# Patient Record
Sex: MALE | Race: WHITE | NOT HISPANIC OR LATINO | Employment: OTHER | URBAN - METROPOLITAN AREA
[De-identification: names, ages, dates, MRNs, and addresses within clinical notes are randomized per-mention and may not be internally consistent; named-entity substitution may affect disease eponyms.]

---

## 2017-06-15 ENCOUNTER — RECORDS - HEALTHEAST (OUTPATIENT)
Dept: LAB | Facility: CLINIC | Age: 52
End: 2017-06-15

## 2017-06-15 ENCOUNTER — RECORDS - HEALTHEAST (OUTPATIENT)
Dept: ADMINISTRATIVE | Facility: OTHER | Age: 52
End: 2017-06-15

## 2017-06-15 LAB
CHOLEST SERPL-MCNC: 171 MG/DL
FASTING STATUS PATIENT QL REPORTED: NORMAL
HDLC SERPL-MCNC: 67 MG/DL
LDLC SERPL CALC-MCNC: 84 MG/DL
PSA SERPL-MCNC: 0.7 NG/ML (ref 0–3.5)
TRIGL SERPL-MCNC: 99 MG/DL

## 2017-10-20 ENCOUNTER — RECORDS - HEALTHEAST (OUTPATIENT)
Dept: ADMINISTRATIVE | Facility: OTHER | Age: 52
End: 2017-10-20

## 2017-10-20 ENCOUNTER — AMBULATORY - HEALTHEAST (OUTPATIENT)
Dept: SURGERY | Facility: CLINIC | Age: 52
End: 2017-10-20

## 2017-10-20 DIAGNOSIS — K40.20 BILATERAL INGUINAL HERNIA: ICD-10-CM

## 2017-11-03 ENCOUNTER — AMBULATORY - HEALTHEAST (OUTPATIENT)
Dept: SURGERY | Facility: CLINIC | Age: 52
End: 2017-11-03

## 2017-11-06 ENCOUNTER — OFFICE VISIT - HEALTHEAST (OUTPATIENT)
Dept: SURGERY | Facility: CLINIC | Age: 52
End: 2017-11-06

## 2017-11-06 DIAGNOSIS — K40.20 BILATERAL INGUINAL HERNIA: ICD-10-CM

## 2017-11-06 ASSESSMENT — MIFFLIN-ST. JEOR: SCORE: 1777

## 2018-01-03 ENCOUNTER — ANESTHESIA - HEALTHEAST (OUTPATIENT)
Dept: SURGERY | Facility: AMBULATORY SURGERY CENTER | Age: 53
End: 2018-01-03

## 2018-01-03 ASSESSMENT — MIFFLIN-ST. JEOR: SCORE: 1795.14

## 2018-01-04 ENCOUNTER — SURGERY - HEALTHEAST (OUTPATIENT)
Dept: SURGERY | Facility: AMBULATORY SURGERY CENTER | Age: 53
End: 2018-01-04

## 2018-01-04 ENCOUNTER — RECORDS - HEALTHEAST (OUTPATIENT)
Dept: ADMINISTRATIVE | Facility: OTHER | Age: 53
End: 2018-01-04

## 2018-06-05 ENCOUNTER — RECORDS - HEALTHEAST (OUTPATIENT)
Dept: LAB | Facility: CLINIC | Age: 53
End: 2018-06-05

## 2018-06-05 LAB
ANION GAP SERPL CALCULATED.3IONS-SCNC: 11 MMOL/L (ref 5–18)
BUN SERPL-MCNC: 29 MG/DL (ref 8–22)
CALCIUM SERPL-MCNC: 9.4 MG/DL (ref 8.5–10.5)
CHLORIDE BLD-SCNC: 106 MMOL/L (ref 98–107)
CHOLEST SERPL-MCNC: 164 MG/DL
CO2 SERPL-SCNC: 24 MMOL/L (ref 22–31)
CREAT SERPL-MCNC: 0.76 MG/DL (ref 0.7–1.3)
FASTING STATUS PATIENT QL REPORTED: NO
GFR SERPL CREATININE-BSD FRML MDRD: >60 ML/MIN/1.73M2
GLUCOSE BLD-MCNC: 80 MG/DL (ref 70–125)
HDLC SERPL-MCNC: 65 MG/DL
LDLC SERPL CALC-MCNC: 74 MG/DL
POTASSIUM BLD-SCNC: 4.1 MMOL/L (ref 3.5–5)
SODIUM SERPL-SCNC: 141 MMOL/L (ref 136–145)
TRIGL SERPL-MCNC: 127 MG/DL

## 2018-06-06 LAB — 25(OH)D3 SERPL-MCNC: 28.2 NG/ML (ref 30–80)

## 2019-01-14 ENCOUNTER — RECORDS - HEALTHEAST (OUTPATIENT)
Dept: LAB | Facility: CLINIC | Age: 54
End: 2019-01-14

## 2019-01-14 LAB
ANION GAP SERPL CALCULATED.3IONS-SCNC: 11 MMOL/L (ref 5–18)
BUN SERPL-MCNC: 27 MG/DL (ref 8–22)
CALCIUM SERPL-MCNC: 9.7 MG/DL (ref 8.5–10.5)
CHLORIDE BLD-SCNC: 103 MMOL/L (ref 98–107)
CO2 SERPL-SCNC: 27 MMOL/L (ref 22–31)
CREAT SERPL-MCNC: 0.81 MG/DL (ref 0.7–1.3)
GFR SERPL CREATININE-BSD FRML MDRD: >60 ML/MIN/1.73M2
GLUCOSE BLD-MCNC: 91 MG/DL (ref 70–125)
POTASSIUM BLD-SCNC: 4.2 MMOL/L (ref 3.5–5)
SODIUM SERPL-SCNC: 141 MMOL/L (ref 136–145)

## 2019-01-15 LAB — 25(OH)D3 SERPL-MCNC: 28.4 NG/ML (ref 30–80)

## 2019-06-12 ENCOUNTER — RECORDS - HEALTHEAST (OUTPATIENT)
Dept: LAB | Facility: CLINIC | Age: 54
End: 2019-06-12

## 2019-06-12 LAB
ANION GAP SERPL CALCULATED.3IONS-SCNC: 8 MMOL/L (ref 5–18)
BUN SERPL-MCNC: 23 MG/DL (ref 8–22)
CALCIUM SERPL-MCNC: 9.9 MG/DL (ref 8.5–10.5)
CHLORIDE BLD-SCNC: 105 MMOL/L (ref 98–107)
CHOLEST SERPL-MCNC: 178 MG/DL
CO2 SERPL-SCNC: 29 MMOL/L (ref 22–31)
CREAT SERPL-MCNC: 0.74 MG/DL (ref 0.7–1.3)
FASTING STATUS PATIENT QL REPORTED: YES
GFR SERPL CREATININE-BSD FRML MDRD: >60 ML/MIN/1.73M2
GLUCOSE BLD-MCNC: 88 MG/DL (ref 70–125)
HDLC SERPL-MCNC: 70 MG/DL
LDLC SERPL CALC-MCNC: 93 MG/DL
POTASSIUM BLD-SCNC: 4.3 MMOL/L (ref 3.5–5)
SODIUM SERPL-SCNC: 142 MMOL/L (ref 136–145)
TRIGL SERPL-MCNC: 77 MG/DL

## 2019-06-13 LAB — 25(OH)D3 SERPL-MCNC: 30.2 NG/ML (ref 30–80)

## 2019-12-11 ENCOUNTER — RECORDS - HEALTHEAST (OUTPATIENT)
Dept: LAB | Facility: CLINIC | Age: 54
End: 2019-12-11

## 2019-12-11 LAB
ANION GAP SERPL CALCULATED.3IONS-SCNC: 12 MMOL/L (ref 5–18)
BUN SERPL-MCNC: 23 MG/DL (ref 8–22)
CALCIUM SERPL-MCNC: 9.6 MG/DL (ref 8.5–10.5)
CHLORIDE BLD-SCNC: 103 MMOL/L (ref 98–107)
CO2 SERPL-SCNC: 27 MMOL/L (ref 22–31)
CREAT SERPL-MCNC: 0.81 MG/DL (ref 0.7–1.3)
GFR SERPL CREATININE-BSD FRML MDRD: >60 ML/MIN/1.73M2
GLUCOSE BLD-MCNC: 72 MG/DL (ref 70–125)
POTASSIUM BLD-SCNC: 4.1 MMOL/L (ref 3.5–5)
SODIUM SERPL-SCNC: 142 MMOL/L (ref 136–145)

## 2019-12-12 LAB — 25(OH)D3 SERPL-MCNC: 34 NG/ML (ref 30–80)

## 2020-06-09 ENCOUNTER — RECORDS - HEALTHEAST (OUTPATIENT)
Dept: LAB | Facility: CLINIC | Age: 55
End: 2020-06-09

## 2020-06-09 LAB
ANION GAP SERPL CALCULATED.3IONS-SCNC: 10 MMOL/L (ref 5–18)
BUN SERPL-MCNC: 27 MG/DL (ref 8–22)
CALCIUM SERPL-MCNC: 10.3 MG/DL (ref 8.5–10.5)
CHLORIDE BLD-SCNC: 103 MMOL/L (ref 98–107)
CHOLEST SERPL-MCNC: 199 MG/DL
CO2 SERPL-SCNC: 28 MMOL/L (ref 22–31)
CREAT SERPL-MCNC: 0.97 MG/DL (ref 0.7–1.3)
FASTING STATUS PATIENT QL REPORTED: NORMAL
GFR SERPL CREATININE-BSD FRML MDRD: >60 ML/MIN/1.73M2
GLUCOSE BLD-MCNC: 95 MG/DL (ref 70–125)
HDLC SERPL-MCNC: 76 MG/DL
LDLC SERPL CALC-MCNC: 93 MG/DL
POTASSIUM BLD-SCNC: 4.3 MMOL/L (ref 3.5–5)
SODIUM SERPL-SCNC: 141 MMOL/L (ref 136–145)
TRIGL SERPL-MCNC: 149 MG/DL

## 2021-01-18 ENCOUNTER — RECORDS - HEALTHEAST (OUTPATIENT)
Dept: LAB | Facility: CLINIC | Age: 56
End: 2021-01-18

## 2021-01-18 LAB
ANION GAP SERPL CALCULATED.3IONS-SCNC: 12 MMOL/L (ref 5–18)
BUN SERPL-MCNC: 25 MG/DL (ref 8–22)
CALCIUM SERPL-MCNC: 9.1 MG/DL (ref 8.5–10.5)
CHLORIDE BLD-SCNC: 101 MMOL/L (ref 98–107)
CHOLEST SERPL-MCNC: 172 MG/DL
CO2 SERPL-SCNC: 27 MMOL/L (ref 22–31)
CREAT SERPL-MCNC: 0.84 MG/DL (ref 0.7–1.3)
FASTING STATUS PATIENT QL REPORTED: NORMAL
GFR SERPL CREATININE-BSD FRML MDRD: >60 ML/MIN/1.73M2
GLUCOSE BLD-MCNC: 86 MG/DL (ref 70–125)
HDLC SERPL-MCNC: 75 MG/DL
LDLC SERPL CALC-MCNC: 77 MG/DL
POTASSIUM BLD-SCNC: 4.4 MMOL/L (ref 3.5–5)
SODIUM SERPL-SCNC: 140 MMOL/L (ref 136–145)
TRIGL SERPL-MCNC: 102 MG/DL

## 2021-01-19 LAB — 25(OH)D3 SERPL-MCNC: 33.5 NG/ML (ref 30–80)

## 2021-04-08 ENCOUNTER — IMMUNIZATION (OUTPATIENT)
Dept: FAMILY MEDICINE | Facility: CLINIC | Age: 56
End: 2021-04-08
Payer: COMMERCIAL

## 2021-04-08 PROCEDURE — 0011A PR COVID VAC MODERNA 100 MCG/0.5 ML IM: CPT

## 2021-04-08 PROCEDURE — 91301 PR COVID VAC MODERNA 100 MCG/0.5 ML IM: CPT

## 2021-05-06 ENCOUNTER — IMMUNIZATION (OUTPATIENT)
Dept: FAMILY MEDICINE | Facility: CLINIC | Age: 56
End: 2021-05-06
Attending: FAMILY MEDICINE
Payer: COMMERCIAL

## 2021-05-06 PROCEDURE — 91301 PR COVID VAC MODERNA 100 MCG/0.5 ML IM: CPT

## 2021-05-06 PROCEDURE — 0012A PR COVID VAC MODERNA 100 MCG/0.5 ML IM: CPT

## 2021-05-15 ENCOUNTER — HEALTH MAINTENANCE LETTER (OUTPATIENT)
Age: 56
End: 2021-05-15

## 2021-05-31 VITALS — BODY MASS INDEX: 23.75 KG/M2 | WEIGHT: 191 LBS | HEIGHT: 75 IN

## 2021-05-31 VITALS — BODY MASS INDEX: 24.25 KG/M2 | WEIGHT: 195 LBS | HEIGHT: 75 IN

## 2021-06-14 NOTE — PROGRESS NOTES
"HPI:  This is a 52 y.o. male here today with concerns of pain and bulging in his  bilateral groins area. he has noted this for the past few month(s). The symptoms have progressed and increased over this time.     Allergies:Budesonide and Levofloxacin    Past Medical History:   Diagnosis Date     Asthma      Glaucoma      Hyperlipemia      Hypertension      Nasal polyps      Pterygium      Seasonal allergic rhinitis        Past Surgical History:   Procedure Laterality Date     KNEE ARTHROSCOPY Bilateral      TONSILLECTOMY       VASECTOMY  01/02/2003       CURRENT MEDS:      Family History   Problem Relation Age of Onset     Hypertension Mother      Hypertension Father      Diabetes Father      No Medical Problems Sister      No Medical Problems Daughter      No Medical Problems Son      No Medical Problems Sister      No Medical Problems Sister         reports that he has never smoked. He has never used smokeless tobacco. He reports that he drinks alcohol. He reports that he does not use illicit drugs.    Review of Systems - Negative except pain, bulges in groin causing increasing symptoms and issues. Otherwise twelve system of review is negative.      Vitals:    11/06/17 0900   BP: (!) 156/105   Patient Site: Left Arm   Patient Position: Sitting   Cuff Size: Adult Regular   Pulse: 60   SpO2: 99%   Weight: 191 lb (86.6 kg)   Height: 6' 3\" (1.905 m)       Body mass index is 23.87 kg/(m^2).    EXAM:  General: NAD   HEENT: normocephalic, PERRLA and EOMS intact  Mounth: Mucus membranes moist  Neck: Supple  Chest: Clear to auscultation bilaterally  CV: RRR  ABD: Soft nontender and nondistended, bilateral inguinal hernia, reducible  EXT: Warm, pulses intact,   Neuro: Alert and oriented x3  Back: no CVA tenderness      Assessment/Plan: Pt with a bilateral inguinal hernia. I discussed this at length with him.  I went over conservative management as well as surgical treatment of this.. I would plan on doing this via " anlaparoscopic  approach with possible use of mesh. I went over the small risks of surgery including but not limited to bleeding and infection, anesthesia, recurrence rates and nerve injury. I discussed the expected recovery time as well. Will get this scheduled. he will contact us to have this scheduled.      Max De La Cruz MD  [facility] Department of Surgery

## 2021-06-15 NOTE — ANESTHESIA PREPROCEDURE EVALUATION
Anesthesia Evaluation      Patient summary reviewed     Airway   Mallampati: II  Neck ROM: full   Pulmonary     breath sounds clear to auscultation                         Cardiovascular   Rhythm: regular  Rate: normal,         Neuro/Psych - negative ROS     Endo/Other - negative ROS      GI/Hepatic/Renal - negative ROS           Dental - normal exam                        Anesthesia Plan  Planned anesthetic: general endotracheal    ASA 2   Induction: intravenous   Anesthetic plan and risks discussed with: patient    Post-op plan: routine recovery

## 2021-06-15 NOTE — ANESTHESIA CARE TRANSFER NOTE
Last vitals:   Vitals:    01/04/18 1130   BP: (!) 175/103   Pulse: 68   Resp: 16   Temp: 36.7  C (98  F)   SpO2: 100%     Patient's level of consciousness is drowsy  Spontaneous respirations: yes  Maintains airway independently: yes  Dentition unchanged: yes  Oropharynx: oropharynx clear of all foreign objects    QCDR Measures:  ASA# 20 - Surgical Safety Checklist: WHO surgical safety checklist completed prior to induction  PQRS# 430 - Adult PONV Prevention: 4558F - Pt received => 2 anti-emetic agents (different classes) preop & intraop  ASA# 8 - Peds PONV Prevention: NA - Not pediatric patient, not GA or 2 or more risk factors NOT present  PQRS# 424 - Johanna-op Temp Management: 4559F - At least one body temp DOCUMENTED => 35.5C or 95.9F within required timeframe  PQRS# 426 - PACU Transfer Protocol: - Transfer of care checklist used  ASA# 14 - Acute Post-op Pain: ASA14B - Patient did NOT experience pain >= 7 out of 10

## 2021-08-04 ENCOUNTER — LAB REQUISITION (OUTPATIENT)
Dept: LAB | Facility: CLINIC | Age: 56
End: 2021-08-04

## 2021-08-04 DIAGNOSIS — E55.9 VITAMIN D DEFICIENCY, UNSPECIFIED: ICD-10-CM

## 2021-08-04 DIAGNOSIS — I10 ESSENTIAL (PRIMARY) HYPERTENSION: ICD-10-CM

## 2021-08-04 DIAGNOSIS — Z12.5 ENCOUNTER FOR SCREENING FOR MALIGNANT NEOPLASM OF PROSTATE: ICD-10-CM

## 2021-08-04 DIAGNOSIS — E78.2 MIXED HYPERLIPIDEMIA: ICD-10-CM

## 2021-08-04 LAB
ALBUMIN SERPL-MCNC: 4 G/DL (ref 3.5–5)
ALP SERPL-CCNC: 77 U/L (ref 45–120)
ALT SERPL W P-5'-P-CCNC: 20 U/L (ref 0–45)
ANION GAP SERPL CALCULATED.3IONS-SCNC: 10 MMOL/L (ref 5–18)
AST SERPL W P-5'-P-CCNC: 19 U/L (ref 0–40)
BILIRUB SERPL-MCNC: 0.9 MG/DL (ref 0–1)
BUN SERPL-MCNC: 24 MG/DL (ref 8–22)
CALCIUM SERPL-MCNC: 9.9 MG/DL (ref 8.5–10.5)
CHLORIDE BLD-SCNC: 103 MMOL/L (ref 98–107)
CHOLEST SERPL-MCNC: 171 MG/DL
CO2 SERPL-SCNC: 29 MMOL/L (ref 22–31)
CREAT SERPL-MCNC: 0.8 MG/DL (ref 0.7–1.3)
FASTING STATUS PATIENT QL REPORTED: NORMAL
GFR SERPL CREATININE-BSD FRML MDRD: >90 ML/MIN/1.73M2
GLUCOSE BLD-MCNC: 95 MG/DL (ref 70–125)
HDLC SERPL-MCNC: 72 MG/DL
LDLC SERPL CALC-MCNC: 86 MG/DL
MAGNESIUM SERPL-MCNC: 2.2 MG/DL (ref 1.8–2.6)
POTASSIUM BLD-SCNC: 4.5 MMOL/L (ref 3.5–5)
PROT SERPL-MCNC: 6.7 G/DL (ref 6–8)
PSA SERPL-MCNC: 0.56 UG/L (ref 0–3.5)
SODIUM SERPL-SCNC: 142 MMOL/L (ref 136–145)
TRIGL SERPL-MCNC: 67 MG/DL

## 2021-08-04 PROCEDURE — 83735 ASSAY OF MAGNESIUM: CPT | Performed by: NURSE PRACTITIONER

## 2021-08-04 PROCEDURE — G0103 PSA SCREENING: HCPCS | Performed by: NURSE PRACTITIONER

## 2021-08-04 PROCEDURE — 82465 ASSAY BLD/SERUM CHOLESTEROL: CPT | Performed by: NURSE PRACTITIONER

## 2021-08-04 PROCEDURE — 82306 VITAMIN D 25 HYDROXY: CPT | Performed by: NURSE PRACTITIONER

## 2021-08-04 PROCEDURE — 82040 ASSAY OF SERUM ALBUMIN: CPT | Performed by: NURSE PRACTITIONER

## 2021-08-05 LAB — DEPRECATED CALCIDIOL+CALCIFEROL SERPL-MC: 45 UG/L (ref 30–80)

## 2021-09-04 ENCOUNTER — HEALTH MAINTENANCE LETTER (OUTPATIENT)
Age: 56
End: 2021-09-04

## 2022-03-07 ENCOUNTER — LAB REQUISITION (OUTPATIENT)
Dept: LAB | Facility: CLINIC | Age: 57
End: 2022-03-07

## 2022-03-07 DIAGNOSIS — E78.2 MIXED HYPERLIPIDEMIA: ICD-10-CM

## 2022-03-07 DIAGNOSIS — E55.9 VITAMIN D DEFICIENCY, UNSPECIFIED: ICD-10-CM

## 2022-03-07 DIAGNOSIS — I10 ESSENTIAL (PRIMARY) HYPERTENSION: ICD-10-CM

## 2022-03-07 LAB
ALBUMIN SERPL-MCNC: 4.3 G/DL (ref 3.5–5)
ALP SERPL-CCNC: 88 U/L (ref 45–120)
ALT SERPL W P-5'-P-CCNC: 27 U/L (ref 0–45)
ANION GAP SERPL CALCULATED.3IONS-SCNC: 15 MMOL/L (ref 5–18)
AST SERPL W P-5'-P-CCNC: 23 U/L (ref 0–40)
BILIRUB SERPL-MCNC: 1.2 MG/DL (ref 0–1)
BUN SERPL-MCNC: 28 MG/DL (ref 8–22)
CALCIUM SERPL-MCNC: 10.1 MG/DL (ref 8.5–10.5)
CHLORIDE BLD-SCNC: 101 MMOL/L (ref 98–107)
CHOLEST SERPL-MCNC: 207 MG/DL
CO2 SERPL-SCNC: 27 MMOL/L (ref 22–31)
CREAT SERPL-MCNC: 0.76 MG/DL (ref 0.7–1.3)
GFR SERPL CREATININE-BSD FRML MDRD: >90 ML/MIN/1.73M2
GLUCOSE BLD-MCNC: 97 MG/DL (ref 70–125)
HDLC SERPL-MCNC: 80 MG/DL
LDLC SERPL CALC-MCNC: 103 MG/DL
POTASSIUM BLD-SCNC: 4.6 MMOL/L (ref 3.5–5)
PROT SERPL-MCNC: 7.3 G/DL (ref 6–8)
SODIUM SERPL-SCNC: 143 MMOL/L (ref 136–145)
TRIGL SERPL-MCNC: 122 MG/DL

## 2022-03-07 PROCEDURE — 80061 LIPID PANEL: CPT | Performed by: NURSE PRACTITIONER

## 2022-03-07 PROCEDURE — 82306 VITAMIN D 25 HYDROXY: CPT | Performed by: NURSE PRACTITIONER

## 2022-03-07 PROCEDURE — 82374 ASSAY BLOOD CARBON DIOXIDE: CPT | Performed by: NURSE PRACTITIONER

## 2022-03-08 LAB — DEPRECATED CALCIDIOL+CALCIFEROL SERPL-MC: 46 UG/L (ref 30–80)

## 2022-06-11 ENCOUNTER — HEALTH MAINTENANCE LETTER (OUTPATIENT)
Age: 57
End: 2022-06-11

## 2022-10-22 ENCOUNTER — HEALTH MAINTENANCE LETTER (OUTPATIENT)
Age: 57
End: 2022-10-22

## 2022-11-01 ENCOUNTER — LAB REQUISITION (OUTPATIENT)
Dept: LAB | Facility: CLINIC | Age: 57
End: 2022-11-01

## 2022-11-01 DIAGNOSIS — I10 ESSENTIAL (PRIMARY) HYPERTENSION: ICD-10-CM

## 2022-11-01 DIAGNOSIS — Z12.5 ENCOUNTER FOR SCREENING FOR MALIGNANT NEOPLASM OF PROSTATE: ICD-10-CM

## 2022-11-01 DIAGNOSIS — E78.2 MIXED HYPERLIPIDEMIA: ICD-10-CM

## 2022-11-01 DIAGNOSIS — E55.9 VITAMIN D DEFICIENCY, UNSPECIFIED: ICD-10-CM

## 2022-11-01 LAB
ALBUMIN SERPL BCG-MCNC: 4.4 G/DL (ref 3.5–5.2)
ALP SERPL-CCNC: 85 U/L (ref 40–129)
ALT SERPL W P-5'-P-CCNC: 27 U/L (ref 10–50)
ANION GAP SERPL CALCULATED.3IONS-SCNC: 13 MMOL/L (ref 7–15)
AST SERPL W P-5'-P-CCNC: 23 U/L (ref 10–50)
BILIRUB SERPL-MCNC: 0.7 MG/DL
BUN SERPL-MCNC: 26 MG/DL (ref 8–23)
CALCIUM SERPL-MCNC: 10 MG/DL (ref 8.6–10)
CHLORIDE SERPL-SCNC: 103 MMOL/L (ref 98–107)
CHOLEST SERPL-MCNC: 180 MG/DL
CREAT SERPL-MCNC: 0.85 MG/DL (ref 0.67–1.17)
DEPRECATED HCO3 PLAS-SCNC: 28 MMOL/L (ref 22–29)
GFR SERPL CREATININE-BSD FRML MDRD: >90 ML/MIN/1.73M2
GLUCOSE SERPL-MCNC: 95 MG/DL (ref 70–99)
HDLC SERPL-MCNC: 89 MG/DL
LDLC SERPL CALC-MCNC: 75 MG/DL
MAGNESIUM SERPL-MCNC: 2.2 MG/DL (ref 1.7–2.3)
NONHDLC SERPL-MCNC: 91 MG/DL
POTASSIUM SERPL-SCNC: 4.8 MMOL/L (ref 3.4–5.3)
PROT SERPL-MCNC: 6.5 G/DL (ref 6.4–8.3)
PSA SERPL-MCNC: 0.6 NG/ML (ref 0–3.5)
SODIUM SERPL-SCNC: 144 MMOL/L (ref 136–145)
TRIGL SERPL-MCNC: 78 MG/DL

## 2022-11-01 PROCEDURE — 80053 COMPREHEN METABOLIC PANEL: CPT | Performed by: NURSE PRACTITIONER

## 2022-11-01 PROCEDURE — 86803 HEPATITIS C AB TEST: CPT | Performed by: NURSE PRACTITIONER

## 2022-11-01 PROCEDURE — G0103 PSA SCREENING: HCPCS | Performed by: NURSE PRACTITIONER

## 2022-11-01 PROCEDURE — 82306 VITAMIN D 25 HYDROXY: CPT | Performed by: NURSE PRACTITIONER

## 2022-11-01 PROCEDURE — 80061 LIPID PANEL: CPT | Performed by: NURSE PRACTITIONER

## 2022-11-01 PROCEDURE — 83735 ASSAY OF MAGNESIUM: CPT | Performed by: NURSE PRACTITIONER

## 2022-11-02 LAB
DEPRECATED CALCIDIOL+CALCIFEROL SERPL-MC: 54 UG/L (ref 20–75)
HCV AB SERPL QL IA: NONREACTIVE

## 2023-05-02 ENCOUNTER — LAB REQUISITION (OUTPATIENT)
Dept: LAB | Facility: CLINIC | Age: 58
End: 2023-05-02

## 2023-05-02 DIAGNOSIS — E55.9 VITAMIN D DEFICIENCY, UNSPECIFIED: ICD-10-CM

## 2023-05-02 DIAGNOSIS — E78.2 MIXED HYPERLIPIDEMIA: ICD-10-CM

## 2023-05-02 DIAGNOSIS — I10 ESSENTIAL (PRIMARY) HYPERTENSION: ICD-10-CM

## 2023-05-02 LAB
ALBUMIN SERPL BCG-MCNC: 4.5 G/DL (ref 3.5–5.2)
ALP SERPL-CCNC: 80 U/L (ref 40–129)
ALT SERPL W P-5'-P-CCNC: 24 U/L (ref 10–50)
ANION GAP SERPL CALCULATED.3IONS-SCNC: 11 MMOL/L (ref 7–15)
AST SERPL W P-5'-P-CCNC: 21 U/L (ref 10–50)
BILIRUB SERPL-MCNC: 0.9 MG/DL
BUN SERPL-MCNC: 23.2 MG/DL (ref 8–23)
CALCIUM SERPL-MCNC: 9.9 MG/DL (ref 8.6–10)
CHLORIDE SERPL-SCNC: 101 MMOL/L (ref 98–107)
CHOLEST SERPL-MCNC: 184 MG/DL
CREAT SERPL-MCNC: 0.93 MG/DL (ref 0.67–1.17)
DEPRECATED HCO3 PLAS-SCNC: 29 MMOL/L (ref 22–29)
GFR SERPL CREATININE-BSD FRML MDRD: >90 ML/MIN/1.73M2
GLUCOSE SERPL-MCNC: 99 MG/DL (ref 70–99)
HDLC SERPL-MCNC: 75 MG/DL
LDLC SERPL CALC-MCNC: 89 MG/DL
MAGNESIUM SERPL-MCNC: 2.3 MG/DL (ref 1.7–2.3)
NONHDLC SERPL-MCNC: 109 MG/DL
POTASSIUM SERPL-SCNC: 4.6 MMOL/L (ref 3.4–5.3)
PROT SERPL-MCNC: 6.7 G/DL (ref 6.4–8.3)
SODIUM SERPL-SCNC: 141 MMOL/L (ref 136–145)
TRIGL SERPL-MCNC: 99 MG/DL

## 2023-05-02 PROCEDURE — 80053 COMPREHEN METABOLIC PANEL: CPT | Performed by: NURSE PRACTITIONER

## 2023-05-02 PROCEDURE — 80061 LIPID PANEL: CPT | Performed by: NURSE PRACTITIONER

## 2023-05-02 PROCEDURE — 82306 VITAMIN D 25 HYDROXY: CPT | Performed by: NURSE PRACTITIONER

## 2023-05-02 PROCEDURE — 83735 ASSAY OF MAGNESIUM: CPT | Performed by: NURSE PRACTITIONER

## 2023-05-03 LAB — DEPRECATED CALCIDIOL+CALCIFEROL SERPL-MC: 56 UG/L (ref 20–75)

## 2023-06-18 ENCOUNTER — HEALTH MAINTENANCE LETTER (OUTPATIENT)
Age: 58
End: 2023-06-18

## 2023-08-12 ENCOUNTER — HOSPITAL ENCOUNTER (EMERGENCY)
Facility: HOSPITAL | Age: 58
Discharge: HOME | End: 2023-08-12
Attending: EMERGENCY MEDICINE | Admitting: EMERGENCY MEDICINE
Payer: COMMERCIAL

## 2023-08-12 ENCOUNTER — APPOINTMENT (OUTPATIENT)
Dept: CT IMAGING | Facility: HOSPITAL | Age: 58
End: 2023-08-12
Attending: EMERGENCY MEDICINE
Payer: COMMERCIAL

## 2023-08-12 VITALS
OXYGEN SATURATION: 94 % | RESPIRATION RATE: 16 BRPM | DIASTOLIC BLOOD PRESSURE: 98 MMHG | TEMPERATURE: 97.6 F | BODY MASS INDEX: 23 KG/M2 | WEIGHT: 185 LBS | SYSTOLIC BLOOD PRESSURE: 151 MMHG | HEART RATE: 48 BPM | HEIGHT: 75 IN

## 2023-08-12 DIAGNOSIS — K76.89 LIVER NODULE: ICD-10-CM

## 2023-08-12 DIAGNOSIS — N20.1 URETEROLITHIASIS: ICD-10-CM

## 2023-08-12 LAB
ALBUMIN SERPL BCG-MCNC: 4.3 G/DL (ref 3.5–5.2)
ALBUMIN UR-MCNC: NEGATIVE MG/DL
ALP SERPL-CCNC: 76 U/L (ref 40–129)
ALT SERPL W P-5'-P-CCNC: 20 U/L (ref 0–70)
ANION GAP SERPL CALCULATED.3IONS-SCNC: 10 MMOL/L (ref 7–15)
APPEARANCE UR: CLEAR
AST SERPL W P-5'-P-CCNC: 27 U/L (ref 0–45)
BASOPHILS # BLD AUTO: 0 10E3/UL (ref 0–0.2)
BASOPHILS NFR BLD AUTO: 1 %
BILIRUB SERPL-MCNC: 0.4 MG/DL
BILIRUB UR QL STRIP: NEGATIVE
BUN SERPL-MCNC: 26.5 MG/DL (ref 8–23)
CALCIUM SERPL-MCNC: 9.3 MG/DL (ref 8.8–10.2)
CHLORIDE SERPL-SCNC: 105 MMOL/L (ref 98–107)
COLOR UR AUTO: ABNORMAL
CREAT SERPL-MCNC: 0.77 MG/DL (ref 0.67–1.17)
DEPRECATED HCO3 PLAS-SCNC: 28 MMOL/L (ref 22–29)
EOSINOPHIL # BLD AUTO: 0.1 10E3/UL (ref 0–0.7)
EOSINOPHIL NFR BLD AUTO: 2 %
ERYTHROCYTE [DISTWIDTH] IN BLOOD BY AUTOMATED COUNT: 12.7 % (ref 10–15)
GFR SERPL CREATININE-BSD FRML MDRD: >90 ML/MIN/1.73M2
GLUCOSE SERPL-MCNC: 113 MG/DL (ref 70–99)
GLUCOSE UR STRIP-MCNC: NEGATIVE MG/DL
HCT VFR BLD AUTO: 45.9 % (ref 40–53)
HGB BLD-MCNC: 15.3 G/DL (ref 13.3–17.7)
HGB UR QL STRIP: ABNORMAL
HOLD SPECIMEN: NORMAL
IMM GRANULOCYTES # BLD: 0 10E3/UL
IMM GRANULOCYTES NFR BLD: 0 %
KETONES UR STRIP-MCNC: NEGATIVE MG/DL
LEUKOCYTE ESTERASE UR QL STRIP: NEGATIVE
LIPASE SERPL-CCNC: 35 U/L (ref 13–60)
LYMPHOCYTES # BLD AUTO: 1.6 10E3/UL (ref 0.8–5.3)
LYMPHOCYTES NFR BLD AUTO: 22 %
MCH RBC QN AUTO: 32.3 PG (ref 26.5–33)
MCHC RBC AUTO-ENTMCNC: 33.3 G/DL (ref 31.5–36.5)
MCV RBC AUTO: 97 FL (ref 78–100)
MONOCYTES # BLD AUTO: 0.8 10E3/UL (ref 0–1.3)
MONOCYTES NFR BLD AUTO: 11 %
MUCOUS THREADS #/AREA URNS LPF: PRESENT /LPF
NEUTROPHILS # BLD AUTO: 4.6 10E3/UL (ref 1.6–8.3)
NEUTROPHILS NFR BLD AUTO: 64 %
NITRATE UR QL: NEGATIVE
NRBC # BLD AUTO: 0 10E3/UL
NRBC BLD AUTO-RTO: 0 /100
PH UR STRIP: 6 [PH] (ref 5–7)
PLATELET # BLD AUTO: 206 10E3/UL (ref 150–450)
POTASSIUM SERPL-SCNC: 4 MMOL/L (ref 3.4–5.3)
PROT SERPL-MCNC: 6.6 G/DL (ref 6.4–8.3)
RBC # BLD AUTO: 4.73 10E6/UL (ref 4.4–5.9)
RBC URINE: 54 /HPF
SODIUM SERPL-SCNC: 143 MMOL/L (ref 136–145)
SP GR UR STRIP: 1.02 (ref 1–1.03)
UROBILINOGEN UR STRIP-MCNC: <2 MG/DL
WBC # BLD AUTO: 7.2 10E3/UL (ref 4–11)
WBC URINE: 2 /HPF

## 2023-08-12 PROCEDURE — 96374 THER/PROPH/DIAG INJ IV PUSH: CPT

## 2023-08-12 PROCEDURE — 36415 COLL VENOUS BLD VENIPUNCTURE: CPT | Performed by: STUDENT IN AN ORGANIZED HEALTH CARE EDUCATION/TRAINING PROGRAM

## 2023-08-12 PROCEDURE — 81001 URINALYSIS AUTO W/SCOPE: CPT | Performed by: EMERGENCY MEDICINE

## 2023-08-12 PROCEDURE — 74177 CT ABD & PELVIS W/CONTRAST: CPT

## 2023-08-12 PROCEDURE — 250N000011 HC RX IP 250 OP 636: Mod: JZ | Performed by: EMERGENCY MEDICINE

## 2023-08-12 PROCEDURE — 250N000011 HC RX IP 250 OP 636: Performed by: EMERGENCY MEDICINE

## 2023-08-12 PROCEDURE — 85025 COMPLETE CBC W/AUTO DIFF WBC: CPT | Performed by: EMERGENCY MEDICINE

## 2023-08-12 PROCEDURE — 99285 EMERGENCY DEPT VISIT HI MDM: CPT | Mod: 25

## 2023-08-12 PROCEDURE — 96376 TX/PRO/DX INJ SAME DRUG ADON: CPT

## 2023-08-12 PROCEDURE — 80053 COMPREHEN METABOLIC PANEL: CPT | Performed by: EMERGENCY MEDICINE

## 2023-08-12 PROCEDURE — 250N000011 HC RX IP 250 OP 636: Mod: JZ | Performed by: FAMILY MEDICINE

## 2023-08-12 PROCEDURE — 96375 TX/PRO/DX INJ NEW DRUG ADDON: CPT

## 2023-08-12 PROCEDURE — 83690 ASSAY OF LIPASE: CPT | Performed by: EMERGENCY MEDICINE

## 2023-08-12 RX ORDER — MORPHINE SULFATE 4 MG/ML
4 INJECTION, SOLUTION INTRAMUSCULAR; INTRAVENOUS ONCE
Status: COMPLETED | OUTPATIENT
Start: 2023-08-12 | End: 2023-08-12

## 2023-08-12 RX ORDER — OXYCODONE HYDROCHLORIDE 5 MG/1
5 TABLET ORAL EVERY 4 HOURS PRN
Qty: 12 TABLET | Refills: 0 | Status: SHIPPED | OUTPATIENT
Start: 2023-08-12 | End: 2023-08-16

## 2023-08-12 RX ORDER — IBUPROFEN 200 MG
400 TABLET ORAL EVERY 6 HOURS
Qty: 56 TABLET | Refills: 0 | Status: SHIPPED | OUTPATIENT
Start: 2023-08-12 | End: 2023-08-19

## 2023-08-12 RX ORDER — ACETAMINOPHEN 500 MG
1000 TABLET ORAL EVERY 6 HOURS
Qty: 56 TABLET | Refills: 0 | Status: SHIPPED | OUTPATIENT
Start: 2023-08-12 | End: 2023-08-19

## 2023-08-12 RX ORDER — KETOROLAC TROMETHAMINE 15 MG/ML
15 INJECTION, SOLUTION INTRAMUSCULAR; INTRAVENOUS ONCE
Status: COMPLETED | OUTPATIENT
Start: 2023-08-12 | End: 2023-08-12

## 2023-08-12 RX ORDER — IOPAMIDOL 755 MG/ML
75 INJECTION, SOLUTION INTRAVASCULAR ONCE
Status: COMPLETED | OUTPATIENT
Start: 2023-08-12 | End: 2023-08-12

## 2023-08-12 RX ORDER — HYDROMORPHONE HYDROCHLORIDE 1 MG/ML
0.5 INJECTION, SOLUTION INTRAMUSCULAR; INTRAVENOUS; SUBCUTANEOUS ONCE
Status: COMPLETED | OUTPATIENT
Start: 2023-08-12 | End: 2023-08-12

## 2023-08-12 RX ORDER — ONDANSETRON 2 MG/ML
4 INJECTION INTRAMUSCULAR; INTRAVENOUS ONCE
Status: COMPLETED | OUTPATIENT
Start: 2023-08-12 | End: 2023-08-12

## 2023-08-12 RX ORDER — DIMENHYDRINATE 50 MG
50 TABLET ORAL EVERY 6 HOURS PRN
Qty: 28 TABLET | Refills: 0 | Status: SHIPPED | OUTPATIENT
Start: 2023-08-12 | End: 2023-08-19

## 2023-08-12 RX ADMIN — ONDANSETRON 4 MG: 2 INJECTION INTRAMUSCULAR; INTRAVENOUS at 05:05

## 2023-08-12 RX ADMIN — KETOROLAC TROMETHAMINE 15 MG: 15 INJECTION INTRAMUSCULAR; INTRAVENOUS at 05:06

## 2023-08-12 RX ADMIN — HYDROMORPHONE HYDROCHLORIDE 0.5 MG: 1 INJECTION, SOLUTION INTRAMUSCULAR; INTRAVENOUS; SUBCUTANEOUS at 05:42

## 2023-08-12 RX ADMIN — MORPHINE SULFATE 4 MG: 4 INJECTION, SOLUTION INTRAMUSCULAR; INTRAVENOUS at 05:05

## 2023-08-12 RX ADMIN — ONDANSETRON 4 MG: 2 INJECTION INTRAMUSCULAR; INTRAVENOUS at 08:14

## 2023-08-12 RX ADMIN — ONDANSETRON 4 MG: 2 INJECTION INTRAMUSCULAR; INTRAVENOUS at 05:42

## 2023-08-12 RX ADMIN — IOPAMIDOL 75 ML: 755 INJECTION, SOLUTION INTRAVENOUS at 05:40

## 2023-08-12 ASSESSMENT — ACTIVITIES OF DAILY LIVING (ADL)
ADLS_ACUITY_SCORE: 33
ADLS_ACUITY_SCORE: 35

## 2023-08-12 NOTE — DISCHARGE INSTRUCTIONS
You were seen in the emergency department for right flank pain and you were found to have a kidney stone causing your pain.  Based on the size and position there is a good chance that this will be able to pass on its own.  Please strain all your urine and keep the stone if you are able to catch it.  Please review the attached instructions.  We are referring you to urology clinic, you should get a  call early next week to help facilitate this or you can contact them yourself using the information above.  Please make sure you are drinking plenty of liquids to stay well-hydrated, review the attached instructions and utilize pain management in an escalating fashion.  If you have any other immediate concerns like intractable vomiting or fever we can reevaluate you in the emergency department as needed.    There is a small spot on your liver that will need to be followed-up by you and your primary care provider.  You should have an MRI on this to further evaluate.

## 2023-08-12 NOTE — ED TRIAGE NOTES
Pt states right sided lower abdominal pain yesterday afternoon. Pt took a naproxen. Pt awoke during the night tonight with severe right-sided abdominal pain. Pt denies N/V/D

## 2023-08-12 NOTE — ED NOTES
EMERGENCY DEPARTMENT SIGN OUT NOTE        ED COURSE AND MEDICAL DECISION MAKING  7:24 AM Patient was signed out to me by Dr Fredy Dickens.  In brief, Seth Lord is a 58 year old male who initially presented with abdominal pain and flank pain.  CT scan shows a 4 mm calculus in the right mid ureter.  Symptoms well controlled and anticipate discharge, urinalysis is pending.  CT scan reviewed by me also demonstrates a 3 cm liver lesion which will need further follow-up.  7:45 AM urinalysis not consistent with infection, patient is stable for discharge.    FINAL IMPRESSION    1. Ureterolithiasis        ED MEDS  Medications   morphine (PF) injection 4 mg (4 mg Intravenous $Given 8/12/23 0505)   ketorolac (TORADOL) injection 15 mg (15 mg Intravenous $Given 8/12/23 0506)   ondansetron (ZOFRAN) injection 4 mg (4 mg Intravenous $Given 8/12/23 0505)   HYDROmorphone (PF) (DILAUDID) injection 0.5 mg (0.5 mg Intravenous $Given 8/12/23 0542)   ondansetron (ZOFRAN) injection 4 mg (4 mg Intravenous $Given 8/12/23 0542)   iopamidol (ISOVUE-370) solution 75 mL (75 mLs Intravenous $Given 8/12/23 0540)       LAB  Labs Ordered and Resulted from Time of ED Arrival to Time of ED Departure   COMPREHENSIVE METABOLIC PANEL - Abnormal       Result Value    Sodium 143      Potassium 4.0      Chloride 105      Carbon Dioxide (CO2) 28      Anion Gap 10      Urea Nitrogen 26.5 (*)     Creatinine 0.77      Calcium 9.3      Glucose 113 (*)     Alkaline Phosphatase 76      AST 27      ALT 20      Protein Total 6.6      Albumin 4.3      Bilirubin Total 0.4      GFR Estimate >90     ROUTINE UA WITH MICROSCOPIC REFLEX TO CULTURE - Abnormal    Color Urine Light Yellow      Appearance Urine Clear      Glucose Urine Negative      Bilirubin Urine Negative      Ketones Urine Negative      Specific Gravity Urine 1.020      Blood Urine 1.0 mg/dL (*)     pH Urine 6.0      Protein Albumin Urine Negative      Urobilinogen Urine <2.0      Nitrite Urine  Negative      Leukocyte Esterase Urine Negative      Mucus Urine Present (*)     RBC Urine 54 (*)     WBC Urine 2     LIPASE - Normal    Lipase 35     CBC WITH PLATELETS AND DIFFERENTIAL    WBC Count 7.2      RBC Count 4.73      Hemoglobin 15.3      Hematocrit 45.9      MCV 97      MCH 32.3      MCHC 33.3      RDW 12.7      Platelet Count 206      % Neutrophils 64      % Lymphocytes 22      % Monocytes 11      % Eosinophils 2      % Basophils 1      % Immature Granulocytes 0      NRBCs per 100 WBC 0      Absolute Neutrophils 4.6      Absolute Lymphocytes 1.6      Absolute Monocytes 0.8      Absolute Eosinophils 0.1      Absolute Basophils 0.0      Absolute Immature Granulocytes 0.0      Absolute NRBCs 0.0         RADIOLOGY    CT Abdomen Pelvis w Contrast   Final Result   IMPRESSION:    1.  Obstructing 4 mm calculus in the right mid ureter. Mild right hydronephrosis.   2.  Additional small nonobstructing right renal calculi.   3.  Colonic diverticulosis.   4.  Indeterminate enhancing 3 cm liver lesion. This could be better evaluated with MRI on a routine basis.          DISCHARGE MEDS  New Prescriptions    ACETAMINOPHEN (TYLENOL) 500 MG TABLET    Take 2 tablets (1,000 mg) by mouth every 6 hours for 7 days    DIMENHYDRINATE (DRAMAMINE) 50 MG TABLET    Take 1 tablet (50 mg) by mouth every 6 hours as needed for other (kidney stone pain management)    IBUPROFEN (ADVIL/MOTRIN) 200 MG TABLET    Take 2 tablets (400 mg) by mouth every 6 hours for 7 days    OXYCODONE (ROXICODONE) 5 MG TABLET    Take 1 tablet (5 mg) by mouth every 4 hours as needed for severe pain If pain is not improved with acetaminophen and ibuprofen.       Surya Connolly MD  [facility] EMERGENCY DEPARTMENT  [address] [place] [address] [phone]       Surya Connolly MD  08/12/23 9061

## 2023-08-12 NOTE — ED PROVIDER NOTES
EMERGENCY DEPARTMENT ENCOUNTER      NAME: Seth Lord  AGE: 58 year old male  YOB: 1965  MRN: [id number]  EVALUATION DATE & TIME: No admission date for patient encounter.    PCP: Michael Martin    ED PROVIDER: Minh Dickens M.D.      Chief Complaint   Patient presents with    Abdominal Pain         FINAL IMPRESSION:  1. Ureterolithiasis    2. Liver nodule          ED COURSE & MEDICAL DECISION MAKIN year old male presents to the Emergency Department for evaluation of right-sided abdominal discomfort.  Patient is uncomfortable appearing but vitally stable when he arrives to the emergency department.  He underwent labs and imaging as below.  CT (personally reviewed) shows a right mid ureteral kidney stone.  Patient was resting comfortably here after analgesics. UA is pending but in process at shift change. Patient and I had discussed a plan for discharge with urology follow-up and patient was in agreement presuming UA is negative.  Referral was placed and patient was given analgesic prescriptions.    At the conclusion of the encounter I discussed the results of all of the tests and the disposition. The questions were answered. The patient or family acknowledged understanding and was agreeable with the care plan.     4:50 AM I met with the patient, obtained history, performed an initial exam, and discussed options and plan for diagnostics and treatment here in the ED.   5:38 AM Nurse notified me he feels more in pain and is more nauseous despite already receiving morphine and zofran.  5:47 AM I updated the patient regarding his preliminary CT findings.    Medical Decision Making    History:  Supplemental history from: Documented in chart, if applicable  External Record(s) reviewed: Documented in chart, if applicable.    Work Up:  Chart documentation includes differential considered and any EKGs or imaging independently interpreted by provider, where specified.  In additional to  work up documented, I considered the following work up: Documented in chart, if applicable.    External consultation:  Discussion of management with another provider: Documented in chart, if applicable    Complicating factors:  Care impacted by chronic illness: Hyperlipidemia and Hypertension  Care affected by social determinants of health: N/A    Disposition considerations: Discharge. I prescribed additional prescription strength medication(s) as charted. See documentation for any additional details.            MEDICATIONS GIVEN IN THE EMERGENCY:  Medications   morphine (PF) injection 4 mg (4 mg Intravenous $Given 8/12/23 0505)   ketorolac (TORADOL) injection 15 mg (15 mg Intravenous $Given 8/12/23 0506)   ondansetron (ZOFRAN) injection 4 mg (4 mg Intravenous $Given 8/12/23 0505)   HYDROmorphone (PF) (DILAUDID) injection 0.5 mg (0.5 mg Intravenous $Given 8/12/23 0542)   ondansetron (ZOFRAN) injection 4 mg (4 mg Intravenous $Given 8/12/23 0542)   iopamidol (ISOVUE-370) solution 75 mL (75 mLs Intravenous $Given 8/12/23 0540)   ondansetron (ZOFRAN) injection 4 mg (4 mg Intravenous $Given 8/12/23 0814)       NEW PRESCRIPTIONS STARTED AT TODAY'S ER VISIT  Discharge Medication List as of 8/12/2023  8:17 AM        START taking these medications    Details   acetaminophen (TYLENOL) 500 MG tablet Take 2 tablets (1,000 mg) by mouth every 6 hours for 7 days, Disp-56 tablet, R-0, Local Print      dimenhyDRINATE (DRAMAMINE) 50 MG tablet Take 1 tablet (50 mg) by mouth every 6 hours as needed for other (kidney stone pain management), Disp-28 tablet, R-0, Local Print      ibuprofen (ADVIL/MOTRIN) 200 MG tablet Take 2 tablets (400 mg) by mouth every 6 hours for 7 days, Disp-56 tablet, R-0, Local Print      oxyCODONE (ROXICODONE) 5 MG tablet Take 1 tablet (5 mg) by mouth every 4 hours as needed for severe pain If pain is not improved with acetaminophen and ibuprofen., Disp-12 tablet, R-0, Local Print                 =================================================================    Lists of [facility] in the [place]    Patient information was obtained from: The patient    Use of : N/A         Seth Lord is a 58 year old male with a pertinent history of hyperlipidemia and hypertension who presents to this ED via walk-in for evaluation of abdominal pain.    The patient has been experiencing right-sided abdominal pain since yesterday afternoon. He took a dose of naproxen, imodium, and pepto bismol with some relief and went to sleep. He was woken up due to the worsening pain around 4 AM today. He reports having hernia surgery a few years ago.     Otherwise, the patient denied having nausea, vomiting, diarrhea, urinary symptoms, and any other medical complaints or concerns at this time.       REVIEW OF SYSTEMS   All systems reviewed and negative except as noted in HPI.    PAST MEDICAL HISTORY:  Past Medical History:   Diagnosis Date    Glaucoma     Hyperlipidemia     Hypertension     Nasal polyposis        PAST SURGICAL HISTORY:  Past Surgical History:   Procedure Laterality Date    ARTHROSCOPY KNEE  1983 (R), 1992 (L)    ARTHROSCOPY KNEE Bilateral     LAPAROSCOPIC HERNIORRHAPHY INGUINAL Bilateral 1/4/2018    Procedure: LAPAROSCOPIC BILATERAL INGUINAL HERNIA REPAIR WITH MESH;  Surgeon: Max De La Cruz MD;  Location: [facility];  Service:     TONSILLECTOMY      TONSILLECTOMY      VASECTOMY  01/02/2003           CURRENT MEDICATIONS:    No current facility-administered medications for this encounter.     Current Outpatient Medications   Medication    acetaminophen (TYLENOL) 500 MG tablet    dimenhyDRINATE (DRAMAMINE) 50 MG tablet    ibuprofen (ADVIL/MOTRIN) 200 MG tablet    oxyCODONE (ROXICODONE) 5 MG tablet    ASPIRIN    FLUTICASONE FUROATE NA    lisinopril (PRINIVIL,ZESTRIL) 20 MG tablet    Timolol 0.25 % SOLN         ALLERGIES:  Allergies   Allergen Reactions    Animal Dander     Dust Mites     Levaquin        FAMILY  "HISTORY:  Family History   Problem Relation Age of Onset    Asthma Brother     Hypertension Father     C.A.D. No family hx of     Diabetes No family hx of     Cerebrovascular Disease No family hx of     Cancer - colorectal No family hx of     Hypertension Mother     Diabetes Father     No Known Problems Sister     No Known Problems Daughter     No Known Problems Son     No Known Problems Sister     No Known Problems Sister        SOCIAL HISTORY:   Social History     Socioeconomic History    Marital status:    Tobacco Use    Smoking status: Never    Smokeless tobacco: Never   Substance and Sexual Activity    Alcohol use: Yes     Comment: Alcoholic Drinks/day: Occasionally    Drug use: No       VITALS:  BP (!) 151/98   Pulse (!) 48   Temp 97.6  F (36.4  C) (Oral)   Resp 16   Ht 1.905 m (6' 3\")   Wt 83.9 kg (185 lb)   SpO2 94%   BMI 23.12 kg/m      PHYSICAL EXAM    Constitutional: Well developed, Well nourished, NAD.  HENT: Normocephalic, Atraumatic. Neck Supple.  Eyes: EOMI, Conjunctiva normal.  Respiratory: Breathing comfortably on room air. Speaks full sentences easily. Lungs clear to ascultation.  Cardiovascular: Normal heart rate, Regular rhythm. No peripheral edema.  Abdomen: Soft, moderate right lower quadrant tenderness and right CVA tenderness.  Musculoskeletal: Good range of motion in all major joints. No major deformities noted.  Integument: Warm, Dry.  Neurologic: Alert & awake, Normal motor function, Normal sensory function, No focal deficits noted.   Psychiatric: Cooperative. Affect appropriate.     LAB:  All pertinent labs reviewed and interpreted.  Labs Ordered and Resulted from Time of ED Arrival to Time of ED Departure   COMPREHENSIVE METABOLIC PANEL - Abnormal       Result Value    Sodium 143      Potassium 4.0      Chloride 105      Carbon Dioxide (CO2) 28      Anion Gap 10      Urea Nitrogen 26.5 (*)     Creatinine 0.77      Calcium 9.3      Glucose 113 (*)     Alkaline Phosphatase 76 "      AST 27      ALT 20      Protein Total 6.6      Albumin 4.3      Bilirubin Total 0.4      GFR Estimate >90     ROUTINE UA WITH MICROSCOPIC REFLEX TO CULTURE - Abnormal    Color Urine Light Yellow      Appearance Urine Clear      Glucose Urine Negative      Bilirubin Urine Negative      Ketones Urine Negative      Specific Gravity Urine 1.020      Blood Urine 1.0 mg/dL (*)     pH Urine 6.0      Protein Albumin Urine Negative      Urobilinogen Urine <2.0      Nitrite Urine Negative      Leukocyte Esterase Urine Negative      Mucus Urine Present (*)     RBC Urine 54 (*)     WBC Urine 2     LIPASE - Normal    Lipase 35     CBC WITH PLATELETS AND DIFFERENTIAL    WBC Count 7.2      RBC Count 4.73      Hemoglobin 15.3      Hematocrit 45.9      MCV 97      MCH 32.3      MCHC 33.3      RDW 12.7      Platelet Count 206      % Neutrophils 64      % Lymphocytes 22      % Monocytes 11      % Eosinophils 2      % Basophils 1      % Immature Granulocytes 0      NRBCs per 100 WBC 0      Absolute Neutrophils 4.6      Absolute Lymphocytes 1.6      Absolute Monocytes 0.8      Absolute Eosinophils 0.1      Absolute Basophils 0.0      Absolute Immature Granulocytes 0.0      Absolute NRBCs 0.0         RADIOLOGY:  Reviewed all pertinent imaging. Please see official radiology report.  CT Abdomen Pelvis w Contrast   Final Result   IMPRESSION:    1.  Obstructing 4 mm calculus in the right mid ureter. Mild right hydronephrosis.   2.  Additional small nonobstructing right renal calculi.   3.  Colonic diverticulosis.   4.  Indeterminate enhancing 3 cm liver lesion. This could be better evaluated with MRI on a routine basis.              I, Ming Jung, am serving as a scribe to document services personally performed by Dr. Minh Dickens, based on my observation and the provider's statements to me. I, Minh Dickens MD attest that Ming Jung is acting in a scribe capacity, has observed my performance of the services and has documented  them in accordance with my direction.    Minh Dickens M.D.  Emergency Medicine  [facility] EMERGENCY DEPARTMENT  [address] [place] [address] [phone]  Dept: [phone]       Minh Dickens MD  08/12/23 4442

## 2023-08-24 PROCEDURE — 99000 SPECIMEN HANDLING OFFICE-LAB: CPT

## 2023-08-24 PROCEDURE — 82365 CALCULUS SPECTROSCOPY: CPT | Mod: 90

## 2023-08-25 ENCOUNTER — VIRTUAL VISIT (OUTPATIENT)
Dept: UROLOGY | Facility: CLINIC | Age: 58
End: 2023-08-25
Attending: EMERGENCY MEDICINE
Payer: COMMERCIAL

## 2023-08-25 ENCOUNTER — LAB (OUTPATIENT)
Dept: LAB | Facility: CLINIC | Age: 58
End: 2023-08-25
Payer: COMMERCIAL

## 2023-08-25 DIAGNOSIS — N20.1 URETEROLITHIASIS: ICD-10-CM

## 2023-08-25 PROCEDURE — 99203 OFFICE O/P NEW LOW 30 MIN: CPT | Mod: VID | Performed by: STUDENT IN AN ORGANIZED HEALTH CARE EDUCATION/TRAINING PROGRAM

## 2023-08-25 RX ORDER — PRAVASTATIN SODIUM 40 MG
40 TABLET ORAL DAILY
COMMUNITY

## 2023-08-25 RX ORDER — CEPHRADINE 500 MG
CAPSULE ORAL
COMMUNITY

## 2023-08-25 RX ORDER — PREDNISOLONE ACETATE 10 MG/ML
SUSPENSION/ DROPS OPHTHALMIC
COMMUNITY

## 2023-08-25 RX ORDER — VITAMIN E (DL,TOCOPHERYL ACET) 45 MG/0.25
DROPS ORAL
COMMUNITY

## 2023-08-25 NOTE — PATIENT INSTRUCTIONS
General recommendations to prevent kidney stones:     1) Low oxalate diet. Foods that are particularly high in oxalate include spinach, rhubarb, beets, nuts, nut butters, and black teas.     2) Low salt diet.      3) High fluid intake. Recommend 3 liters of fluid daily to produce goal of 2.5L of urine.     4) Modest animal protein.      5) Normal dietary calcium.

## 2023-08-25 NOTE — PROGRESS NOTES
Chief Complaint:   Kidney stones         History of Present Illness:   Seth Lord is a 58 year old male presenting for an evaluation of kidney stones.     The patient presented to the ED on 8/12/2023 for right sided abdominal pain. CT abdomen pelvis was notable for an obstructing 4 mm stone in the right mid ureter. Additional non-obstructing stones were seen in the right ureter. UA was notable for 54 RBCs.     Today, he is asymptomatic. His pain last for about a week. He then developed more urinary urgency and he caught a stone yesterday night.     This was his first kidney stone. He denies a family history of kidney stones.             Past Medical History:     Past Medical History:   Diagnosis Date    Glaucoma     Hyperlipidemia     Hypertension     Nasal polyposis             Past Surgical History:     Past Surgical History:   Procedure Laterality Date    ARTHROSCOPY KNEE  1983 (R), 1992 (L)    ARTHROSCOPY KNEE Bilateral     LAPAROSCOPIC HERNIORRHAPHY INGUINAL Bilateral 1/4/2018    Procedure: LAPAROSCOPIC BILATERAL INGUINAL HERNIA REPAIR WITH MESH;  Surgeon: Max De La Cruz MD;  Location: [facility] [place];  Service:     TONSILLECTOMY      TONSILLECTOMY      VASECTOMY  01/02/2003            Medications     Current Outpatient Medications   Medication    ASPIRIN    Calcium-Magnesium-Vitamin D (CALCIUM+D3 GRADUAL RELEASE) 600- MG-MG-UNIT TB24    FLUTICASONE FUROATE NA    lisinopril (PRINIVIL,ZESTRIL) 20 MG tablet    pravastatin (PRAVACHOL) 40 MG tablet    PRED FORTE 1 % ophthalmic suspension    Timolol 0.25 % SOLN    Vitamin D-Vitamin K (K2 PLUS D3) 100-1000 MCG-UNIT TABS     No current facility-administered medications for this visit.            Allergies:   Animal dander, Dust mites, Levaquin, and Budesonide         Review of Systems:  From intake questionnaire   Negative 14 system review except as noted on HPI, nurse's note.         Physical Exam:   Patient is a 58 year old male evaluated  via video visit.       Labs and Pathology:    I personally reviewed all applicable laboratory data and went over findings with patient  Significant for:    CBC RESULTS:  Recent Labs   Lab Test 08/12/23  0442   WBC 7.2   HGB 15.3           BMP RESULTS:  Recent Labs   Lab Test 08/12/23  0442 05/02/23  0850 11/01/22  0904 03/07/22  1408 08/04/21  0845 01/18/21  0836 06/09/20  0902 12/11/19  0830 06/12/19  0928    141 144 143   < > 140 141 142 142   POTASSIUM 4.0 4.6 4.8 4.6   < > 4.4 4.3 4.1 4.3   CHLORIDE 105 101 103 101   < > 101 103 103 105   CO2 28 29 28 27   < > 27 28 27 29   ANIONGAP 10 11 13 15   < > 12 10 12 8   * 99 95 97   < > 86 95 72 88   BUN 26.5* 23.2* 26.0* 28*   < > 25* 27* 23* 23*   CR 0.77 0.93 0.85 0.76   < > 0.84 0.97 0.81 0.74   GFRESTIMATED >90 >90 >90 >90   < > >60 >60 >60 >60   GFRESTBLACK  --   --   --   --   --  >60 >60 >60 >60   MONICA 9.3 9.9 10.0 10.1   < > 9.1 10.3 9.6 9.9    < > = values in this interval not displayed.       UA RESULTS:   Recent Labs   Lab Test 08/12/23  0630   SG 1.020   URINEPH 6.0   NITRITE Negative   RBCU 54*   WBCU 2       PSA RESULTS  Prostate Specific Antigen Screen   Date Value Ref Range Status   11/01/2022 0.60 0.00 - 3.50 ng/mL Final   08/04/2021 0.56 0.00 - 3.50 ug/L Final   06/15/2017 0.7 0.0 - 3.5 ng/mL Final   12/10/2014 0.6 0.0 - 3.5 ng/mL Final           Imaging:    I personally reviewed all applicable imaging and went over findings with patient.  Significant for:    Results for orders placed or performed during the hospital encounter of 08/12/23   CT Abdomen Pelvis w Contrast    Narrative    EXAM: CT ABDOMEN PELVIS W CONTRAST  LOCATION: [facility]  DATE: 8/12/2023    INDICATION: R mid abdominal pain, appendicitis or ureterolithiasis suspect  COMPARISON: None.  TECHNIQUE: CT scan of the abdomen and pelvis was performed following injection of IV contrast. Multiplanar reformats were obtained. Dose reduction  techniques were used.  CONTRAST: ISOVUE 370 75ML    FINDINGS:   LOWER CHEST: Normal.    HEPATOBILIARY: Approximately 3 cm enhancing lesion in the inferior right hepatic lobe incompletely characterized on this exam. Small right hepatic lobe cysts, and few additional tiny low-attenuation lesions incompletely characterized. No biliary   dilatation. No calcified gallstones.    PANCREAS: Normal.    SPLEEN: Normal.    ADRENAL GLANDS: Normal.    KIDNEYS/BLADDER: There is a 4 mm calculus in the right mid ureter with mild upstream hydroureteronephrosis. Few additional tiny nonobstructing right renal calculi. Left kidney is unremarkable.    BOWEL: Diverticulosis of the colon. No acute inflammatory change. No obstruction. Normal appendix. No free air or free fluid.    LYMPH NODES: Normal.    VASCULATURE: Unremarkable.    PELVIC ORGANS: Mildly enlarged prostate.    MUSCULOSKELETAL: Normal.      Impression    IMPRESSION:   1.  Obstructing 4 mm calculus in the right mid ureter. Mild right hydronephrosis.  2.  Additional small nonobstructing right renal calculi.  3.  Colonic diverticulosis.  4.  Indeterminate enhancing 3 cm liver lesion. This could be better evaluated with MRI on a routine basis.            Assessment and Plan:   Assessment: Seth Lord is a 58 year old male seen in evaluation for a 4 mm right mid ureteral stone. The patient passed the stones yesterday. He will drop them off at the lab for analysis.     We reviewed general recommendations to prevent kidney stones including the followin) Low oxalate diet. We discussed foods that are particularly high in oxalate including spinach, rhubarb, beets, nuts, nut butters, and black teas.     2) Low salt diet. Discussed common foods with high amounts of salt as well as dietary strategies to minimize sodium.     3) High fluid intake. Recommend 3 liters of fluid daily to produce goal of 2.5L of urine.     4) Modest animal protein. Recommend limiting animal  protein to one serving or less daily.     5) Normal dietary calcium.       Plan:  Stone analysis.   Follow up with urology as needed.       Sasha Mojica PA-C  Department of Urology

## 2023-08-25 NOTE — PROGRESS NOTES
Seth Lord is a 58 year old year old who is being evaluated via a billable video visit.      How would you like to obtain your AVS? [id number]  If the video visit is dropped, the invitation should be resent by: Text to cell phone: [phone]  Will anyone else be joining your video visit? No    Video-Visit Details    Type of service:  Video Visit   Video Start Time: 9:34 AM  Video End Time:9:46 AM    Originating Location (pt. Location): Home    Distant Location (provider location):  On-site  Platform used for Video Visit: [employer]

## 2023-08-28 LAB
APPEARANCE STONE: NORMAL
COMPN STONE: NORMAL
SPECIMEN WT: 47 MG

## 2024-06-19 ENCOUNTER — LAB REQUISITION (OUTPATIENT)
Dept: LAB | Facility: CLINIC | Age: 59
End: 2024-06-19

## 2024-06-19 DIAGNOSIS — E78.2 MIXED HYPERLIPIDEMIA: ICD-10-CM

## 2024-06-19 DIAGNOSIS — I10 ESSENTIAL (PRIMARY) HYPERTENSION: ICD-10-CM

## 2024-06-19 DIAGNOSIS — E55.9 VITAMIN D DEFICIENCY, UNSPECIFIED: ICD-10-CM

## 2024-06-19 DIAGNOSIS — Z12.5 ENCOUNTER FOR SCREENING FOR MALIGNANT NEOPLASM OF PROSTATE: ICD-10-CM

## 2024-06-19 LAB
ALBUMIN SERPL BCG-MCNC: 4.2 G/DL (ref 3.5–5.2)
ALP SERPL-CCNC: 70 U/L (ref 40–150)
ALT SERPL W P-5'-P-CCNC: 15 U/L (ref 0–70)
ANION GAP SERPL CALCULATED.3IONS-SCNC: 11 MMOL/L (ref 7–15)
AST SERPL W P-5'-P-CCNC: 21 U/L (ref 0–45)
BILIRUB SERPL-MCNC: 1 MG/DL
BUN SERPL-MCNC: 23.1 MG/DL (ref 8–23)
CALCIUM SERPL-MCNC: 9.8 MG/DL (ref 8.8–10.2)
CHLORIDE SERPL-SCNC: 103 MMOL/L (ref 98–107)
CHOLEST SERPL-MCNC: 164 MG/DL
CREAT SERPL-MCNC: 0.83 MG/DL (ref 0.67–1.17)
DEPRECATED HCO3 PLAS-SCNC: 26 MMOL/L (ref 22–29)
EGFRCR SERPLBLD CKD-EPI 2021: >90 ML/MIN/1.73M2
FASTING STATUS PATIENT QL REPORTED: ABNORMAL
FASTING STATUS PATIENT QL REPORTED: NORMAL
GLUCOSE SERPL-MCNC: 98 MG/DL (ref 70–99)
HDLC SERPL-MCNC: 77 MG/DL
LDLC SERPL CALC-MCNC: 70 MG/DL
NONHDLC SERPL-MCNC: 87 MG/DL
POTASSIUM SERPL-SCNC: 4.7 MMOL/L (ref 3.4–5.3)
PROT SERPL-MCNC: 6.6 G/DL (ref 6.4–8.3)
PSA SERPL DL<=0.01 NG/ML-MCNC: 0.63 NG/ML (ref 0–4.5)
SODIUM SERPL-SCNC: 140 MMOL/L (ref 135–145)
TRIGL SERPL-MCNC: 84 MG/DL
VIT D+METAB SERPL-MCNC: 54 NG/ML (ref 20–50)

## 2024-06-19 PROCEDURE — 80053 COMPREHEN METABOLIC PANEL: CPT | Performed by: NURSE PRACTITIONER

## 2024-06-19 PROCEDURE — 82306 VITAMIN D 25 HYDROXY: CPT | Performed by: NURSE PRACTITIONER

## 2024-06-19 PROCEDURE — G0103 PSA SCREENING: HCPCS | Performed by: NURSE PRACTITIONER

## 2024-06-19 PROCEDURE — 80061 LIPID PANEL: CPT | Performed by: NURSE PRACTITIONER

## 2024-08-11 ENCOUNTER — HEALTH MAINTENANCE LETTER (OUTPATIENT)
Age: 59
End: 2024-08-11

## 2025-05-15 ENCOUNTER — LAB REQUISITION (OUTPATIENT)
Dept: LAB | Facility: CLINIC | Age: 60
End: 2025-05-15

## 2025-05-15 DIAGNOSIS — I10 ESSENTIAL (PRIMARY) HYPERTENSION: ICD-10-CM

## 2025-05-15 LAB
ALBUMIN SERPL BCG-MCNC: 4.4 G/DL (ref 3.5–5.2)
ALP SERPL-CCNC: 79 U/L (ref 40–150)
ALT SERPL W P-5'-P-CCNC: 23 U/L (ref 0–70)
ANION GAP SERPL CALCULATED.3IONS-SCNC: 14 MMOL/L (ref 7–15)
AST SERPL W P-5'-P-CCNC: 24 U/L (ref 0–45)
BILIRUB SERPL-MCNC: 1 MG/DL
BUN SERPL-MCNC: 35.1 MG/DL (ref 8–23)
CALCIUM SERPL-MCNC: 9.7 MG/DL (ref 8.8–10.4)
CHLORIDE SERPL-SCNC: 102 MMOL/L (ref 98–107)
CHOLEST SERPL-MCNC: 182 MG/DL
CREAT SERPL-MCNC: 0.88 MG/DL (ref 0.67–1.17)
EGFRCR SERPLBLD CKD-EPI 2021: >90 ML/MIN/1.73M2
FASTING STATUS PATIENT QL REPORTED: NO
GLUCOSE SERPL-MCNC: 90 MG/DL (ref 70–99)
HCO3 SERPL-SCNC: 26 MMOL/L (ref 22–29)
HDLC SERPL-MCNC: 81 MG/DL
LDLC SERPL CALC-MCNC: 80 MG/DL
NONHDLC SERPL-MCNC: 101 MG/DL
POTASSIUM SERPL-SCNC: 3.9 MMOL/L (ref 3.4–5.3)
PROT SERPL-MCNC: 6.9 G/DL (ref 6.4–8.3)
PSA SERPL DL<=0.01 NG/ML-MCNC: 0.62 NG/ML (ref 0–4.5)
SODIUM SERPL-SCNC: 142 MMOL/L (ref 135–145)
TRIGL SERPL-MCNC: 104 MG/DL

## 2025-05-15 PROCEDURE — 80061 LIPID PANEL: CPT

## 2025-05-15 PROCEDURE — 80053 COMPREHEN METABOLIC PANEL: CPT

## 2025-05-15 PROCEDURE — G0103 PSA SCREENING: HCPCS

## 2025-08-16 ENCOUNTER — HEALTH MAINTENANCE LETTER (OUTPATIENT)
Age: 60
End: 2025-08-16